# Patient Record
Sex: MALE | URBAN - METROPOLITAN AREA
[De-identification: names, ages, dates, MRNs, and addresses within clinical notes are randomized per-mention and may not be internally consistent; named-entity substitution may affect disease eponyms.]

---

## 2021-08-31 ENCOUNTER — OFFICE VISIT (OUTPATIENT)
Dept: URBAN - METROPOLITAN AREA CLINIC 98 | Facility: CLINIC | Age: 26
End: 2021-08-31

## 2021-08-31 ENCOUNTER — LAB OUTSIDE AN ENCOUNTER (OUTPATIENT)
Dept: URBAN - METROPOLITAN AREA CLINIC 98 | Facility: CLINIC | Age: 26
End: 2021-08-31

## 2021-08-31 ENCOUNTER — CLAIMS CREATED FROM THE CLAIM WINDOW (OUTPATIENT)
Dept: URBAN - METROPOLITAN AREA CLINIC 98 | Facility: CLINIC | Age: 26
End: 2021-08-31

## 2021-08-31 VITALS
HEIGHT: 68 IN | TEMPERATURE: 96.7 F | HEART RATE: 55 BPM | WEIGHT: 157 LBS | DIASTOLIC BLOOD PRESSURE: 73 MMHG | BODY MASS INDEX: 23.79 KG/M2 | SYSTOLIC BLOOD PRESSURE: 120 MMHG

## 2021-08-31 DIAGNOSIS — R10.13 DYSPEPSIA: ICD-10-CM

## 2021-08-31 DIAGNOSIS — Z72.0 TOBACCO ABUSE: ICD-10-CM

## 2021-08-31 DIAGNOSIS — F12.90 MARIJUANA USE: ICD-10-CM

## 2021-08-31 DIAGNOSIS — Z12.11 COLON CANCER SCREENING: ICD-10-CM

## 2021-08-31 DIAGNOSIS — R11.0 NAUSEA: ICD-10-CM

## 2021-08-31 PROCEDURE — G9903 PT SCRN TBCO ID AS NON USER: HCPCS | Performed by: INTERNAL MEDICINE

## 2021-08-31 PROCEDURE — 3017F COLORECTAL CA SCREEN DOC REV: CPT | Performed by: INTERNAL MEDICINE

## 2021-08-31 PROCEDURE — G8482 FLU IMMUNIZE ORDER/ADMIN: HCPCS | Performed by: INTERNAL MEDICINE

## 2021-08-31 PROCEDURE — 99204 OFFICE O/P NEW MOD 45 MIN: CPT | Performed by: INTERNAL MEDICINE

## 2021-08-31 PROCEDURE — G8427 DOCREV CUR MEDS BY ELIG CLIN: HCPCS | Performed by: INTERNAL MEDICINE

## 2021-08-31 PROCEDURE — G8420 CALC BMI NORM PARAMETERS: HCPCS | Performed by: INTERNAL MEDICINE

## 2021-08-31 RX ORDER — OMEPRAZOLE 40 MG/1
1 CAPSULE 30 MINUTES BEFORE MORNING MEAL CAPSULE, DELAYED RELEASE ORAL ONCE A DAY
Qty: 90 | Refills: 3 | OUTPATIENT
Start: 2021-08-31

## 2021-08-31 NOTE — HPI-TODAY'S VISIT:
Works building movie sets PMH of anxiety/depression Long h/o stomach issues Lately, he wakes up nauseated every morning with wrenching pain in upper abdomen Feels better when he eats something Associated with LUQ cramping 6-7/10 pain lasts until he eats Does smoke MJ about 2x/day +ve vape No other A/A factors Eats dinner around 6-7pm.  Lays down around 8:30-9  Eating late at night? . Also notes some bowel urgency lately with small stools.  Will then have a more regular stool a few hours later.  No blood or mucus in stools Reports a loss of appetite as well Denies weight loss

## 2021-09-02 ENCOUNTER — DASHBOARD ENCOUNTERS (OUTPATIENT)
Age: 26
End: 2021-09-02

## 2021-09-02 LAB
A/G RATIO: 2.3
ALBUMIN: 5
ALKALINE PHOSPHATASE: 50
ALT (SGPT): 13
AST (SGOT): 19
BASO (ABSOLUTE): 0
BASOS: 1
BILIRUBIN, TOTAL: 1.3
BUN/CREATININE RATIO: 14
BUN: 14
C-REACTIVE PROTEIN, QUANT: <1
CALCIUM: 9.8
CARBON DIOXIDE, TOTAL: 26
CHLORIDE: 101
CREATININE: 0.98
EGFR IF AFRICN AM: 124
EGFR IF NONAFRICN AM: 107
ENDOMYSIAL ANTIBODY IGA: POSITIVE
EOS (ABSOLUTE): 0
EOS: 1
GLOBULIN, TOTAL: 2.2
GLUCOSE: 88
H PYLORI BREATH TEST: NEGATIVE
HEMATOCRIT: 42.7
HEMATOLOGY COMMENTS:: (no result)
HEMOGLOBIN: 14.6
IMMATURE CELLS: (no result)
IMMATURE GRANS (ABS): 0
IMMATURE GRANULOCYTES: 0
IMMUNOGLOBULIN A, QN, SERUM: 204
LYMPHS (ABSOLUTE): 1.4
LYMPHS: 29
MCH: 33.3
MCHC: 34.2
MCV: 98
MONOCYTES(ABSOLUTE): 0.6
MONOCYTES: 14
NEUTROPHILS (ABSOLUTE): 2.6
NEUTROPHILS: 55
NRBC: (no result)
PLATELETS: 285
POTASSIUM: 3.7
PROTEIN, TOTAL: 7.2
RBC: 4.38
RDW: 11.2
SODIUM: 139
T-TRANSGLUTAMINASE (TTG) IGA: 12
T4,FREE(DIRECT): 1.35
TSH: 0.68
WBC: 4.7

## 2021-09-15 ENCOUNTER — LAB OUTSIDE AN ENCOUNTER (OUTPATIENT)
Dept: URBAN - METROPOLITAN AREA TELEHEALTH 2 | Facility: TELEHEALTH | Age: 26
End: 2021-09-15

## 2021-09-15 ENCOUNTER — OFFICE VISIT (OUTPATIENT)
Dept: URBAN - METROPOLITAN AREA TELEHEALTH 2 | Facility: TELEHEALTH | Age: 26
End: 2021-09-15

## 2021-09-15 DIAGNOSIS — K90.0 CELIAC DISEASE: ICD-10-CM

## 2021-09-15 DIAGNOSIS — Z12.11 COLON CANCER SCREENING: ICD-10-CM

## 2021-09-15 DIAGNOSIS — R11.0 NAUSEA: ICD-10-CM

## 2021-09-15 DIAGNOSIS — R10.13 DYSPEPSIA: ICD-10-CM

## 2021-09-15 DIAGNOSIS — Z72.0 TOBACCO ABUSE: ICD-10-CM

## 2021-09-15 DIAGNOSIS — F12.90 MARIJUANA USE: ICD-10-CM

## 2021-09-15 PROBLEM — 422587007: Status: ACTIVE | Noted: 2021-08-31

## 2021-09-15 PROBLEM — 110483000: Status: ACTIVE | Noted: 2021-08-31

## 2021-09-15 PROBLEM — 733460004: Status: ACTIVE | Noted: 2021-08-31

## 2021-09-15 PROBLEM — 162031009: Status: ACTIVE | Noted: 2021-08-31

## 2021-09-15 PROCEDURE — G8482 FLU IMMUNIZE ORDER/ADMIN: HCPCS | Performed by: INTERNAL MEDICINE

## 2021-09-15 PROCEDURE — G8420 CALC BMI NORM PARAMETERS: HCPCS | Performed by: INTERNAL MEDICINE

## 2021-09-15 PROCEDURE — G8427 DOCREV CUR MEDS BY ELIG CLIN: HCPCS | Performed by: INTERNAL MEDICINE

## 2021-09-15 PROCEDURE — 3017F COLORECTAL CA SCREEN DOC REV: CPT | Performed by: INTERNAL MEDICINE

## 2021-09-15 PROCEDURE — 99214 OFFICE O/P EST MOD 30 MIN: CPT | Performed by: INTERNAL MEDICINE

## 2021-09-15 RX ORDER — OMEPRAZOLE 40 MG/1
1 CAPSULE 30 MINUTES BEFORE MORNING MEAL CAPSULE, DELAYED RELEASE ORAL ONCE A DAY
Qty: 90 | Refills: 3 | Status: ACTIVE | COMMUNITY
Start: 2021-08-31

## 2021-09-15 NOTE — HPI-TODAY'S VISIT:
f/u visit Labs after last visit revealed likely celiac disease He has actually cut out gluten, and is feeling much better     . PRIOR VISIT: Works building movie sets PMH of anxiety/depression Long h/o stomach issues Lately, he wakes up nauseated every morning with wrenching pain in upper abdomen Feels better when he eats something Associated with LUQ cramping 6-7/10 pain lasts until he eats Does smoke MJ about 2x/day +ve vape No other A/A factors Eats dinner around 6-7pm.  Lays down around 8:30-9  Eating late at night? . Also notes some bowel urgency lately with small stools.  Will then have a more regular stool a few hours later.  No blood or mucus in stools Reports a loss of appetite as well Denies weight loss

## 2021-09-27 PROBLEM — 396331005: Status: ACTIVE | Noted: 2021-09-15

## 2021-10-01 ENCOUNTER — OFFICE VISIT (OUTPATIENT)
Dept: URBAN - METROPOLITAN AREA CLINIC 98 | Facility: CLINIC | Age: 26
End: 2021-10-01

## 2021-10-07 ENCOUNTER — OFFICE VISIT (OUTPATIENT)
Dept: URBAN - METROPOLITAN AREA SURGERY CENTER 18 | Facility: SURGERY CENTER | Age: 26
End: 2021-10-07